# Patient Record
Sex: FEMALE | Race: WHITE
[De-identification: names, ages, dates, MRNs, and addresses within clinical notes are randomized per-mention and may not be internally consistent; named-entity substitution may affect disease eponyms.]

---

## 2018-12-11 ENCOUNTER — HOSPITAL ENCOUNTER (OUTPATIENT)
Dept: HOSPITAL 76 - RT.S | Age: 53
Discharge: HOME | End: 2018-12-11
Attending: NURSE PRACTITIONER
Payer: COMMERCIAL

## 2018-12-11 DIAGNOSIS — R07.89: Primary | ICD-10-CM

## 2018-12-11 PROCEDURE — 93005 ELECTROCARDIOGRAM TRACING: CPT

## 2019-01-04 ENCOUNTER — HOSPITAL ENCOUNTER (EMERGENCY)
Dept: HOSPITAL 76 - ED | Age: 54
Discharge: HOME | End: 2019-01-04
Payer: COMMERCIAL

## 2019-01-04 VITALS — SYSTOLIC BLOOD PRESSURE: 144 MMHG | DIASTOLIC BLOOD PRESSURE: 85 MMHG

## 2019-01-04 DIAGNOSIS — Z82.49: ICD-10-CM

## 2019-01-04 DIAGNOSIS — R07.89: Primary | ICD-10-CM

## 2019-01-04 DIAGNOSIS — R94.31: ICD-10-CM

## 2019-01-04 DIAGNOSIS — R03.0: ICD-10-CM

## 2019-01-04 DIAGNOSIS — Z87.891: ICD-10-CM

## 2019-01-04 LAB
ALBUMIN DIAFP-MCNC: 4.2 G/DL (ref 3.2–5.5)
ALBUMIN/GLOB SERPL: 1.1 {RATIO} (ref 1–2.2)
ALP SERPL-CCNC: 67 IU/L (ref 42–121)
ALT SERPL W P-5'-P-CCNC: 30 IU/L (ref 10–60)
ANION GAP SERPL CALCULATED.4IONS-SCNC: 8 MMOL/L (ref 6–13)
AST SERPL W P-5'-P-CCNC: 29 IU/L (ref 10–42)
BASOPHILS NFR BLD AUTO: 0 10^3/UL (ref 0–0.1)
BASOPHILS NFR BLD AUTO: 0.2 %
BILIRUB BLD-MCNC: 0.7 MG/DL (ref 0.2–1)
BUN SERPL-MCNC: 9 MG/DL (ref 6–20)
CALCIUM UR-MCNC: 8.8 MG/DL (ref 8.5–10.3)
CHLORIDE SERPL-SCNC: 99 MMOL/L (ref 101–111)
CO2 SERPL-SCNC: 29 MMOL/L (ref 21–32)
CREAT SERPLBLD-SCNC: 0.6 MG/DL (ref 0.4–1)
EOSINOPHIL # BLD AUTO: 0.1 10^3/UL (ref 0–0.7)
EOSINOPHIL NFR BLD AUTO: 1.8 %
ERYTHROCYTE [DISTWIDTH] IN BLOOD BY AUTOMATED COUNT: 12.4 % (ref 12–15)
GFRSERPLBLD MDRD-ARVRAT: 105 ML/MIN/{1.73_M2} (ref 89–?)
GLOBULIN SER-MCNC: 3.8 G/DL (ref 2.1–4.2)
GLUCOSE SERPL-MCNC: 95 MG/DL (ref 70–100)
HGB UR QL STRIP: 14.8 G/DL (ref 12–16)
LIPASE SERPL-CCNC: 38 U/L (ref 22–51)
LYMPHOCYTES # SPEC AUTO: 1.8 10^3/UL (ref 1.5–3.5)
LYMPHOCYTES NFR BLD AUTO: 29.8 %
MCH RBC QN AUTO: 33.3 PG (ref 27–31)
MCHC RBC AUTO-ENTMCNC: 34 G/DL (ref 32–36)
MCV RBC AUTO: 97.9 FL (ref 81–99)
MONOCYTES # BLD AUTO: 0.4 10^3/UL (ref 0–1)
MONOCYTES NFR BLD AUTO: 6.8 %
NEUTROPHILS # BLD AUTO: 3.7 10^3/UL (ref 1.5–6.6)
NEUTROPHILS # SNV AUTO: 6 X10^3/UL (ref 4.8–10.8)
NEUTROPHILS NFR BLD AUTO: 61.4 %
PDW BLD AUTO: 7 FL (ref 7.9–10.8)
PLATELET # BLD: 320 10^3/UL (ref 130–450)
PROT SPEC-MCNC: 8 G/DL (ref 6.7–8.2)
RBC MAR: 4.45 10^6/UL (ref 4.2–5.4)
SODIUM SERPLBLD-SCNC: 136 MMOL/L (ref 135–145)

## 2019-01-04 PROCEDURE — 83690 ASSAY OF LIPASE: CPT

## 2019-01-04 PROCEDURE — 84484 ASSAY OF TROPONIN QUANT: CPT

## 2019-01-04 PROCEDURE — 93005 ELECTROCARDIOGRAM TRACING: CPT

## 2019-01-04 PROCEDURE — 99283 EMERGENCY DEPT VISIT LOW MDM: CPT

## 2019-01-04 PROCEDURE — 36415 COLL VENOUS BLD VENIPUNCTURE: CPT

## 2019-01-04 PROCEDURE — 99284 EMERGENCY DEPT VISIT MOD MDM: CPT

## 2019-01-04 PROCEDURE — 71046 X-RAY EXAM CHEST 2 VIEWS: CPT

## 2019-01-04 PROCEDURE — 85379 FIBRIN DEGRADATION QUANT: CPT

## 2019-01-04 PROCEDURE — 85025 COMPLETE CBC W/AUTO DIFF WBC: CPT

## 2019-01-04 PROCEDURE — 71275 CT ANGIOGRAPHY CHEST: CPT

## 2019-01-04 PROCEDURE — 80053 COMPREHEN METABOLIC PANEL: CPT

## 2019-01-04 NOTE — ED PHYSICIAN DOCUMENTATION
History of Present Illness





- Stated complaint


Stated Complaint: CHEST PX





- Chief complaint


Chief Complaint: Cardiac





- Additonal information


Additional information: 





hx from pt


52 y/o f


to ED with CP


this CP started yesterday and has been present for 24 hr


is left sided 


sharp


worse with breathing


nausea


no diaphoresis


no cough


legs are chronically swollen


she has had similar sx many time before


saw PMD in [place] and had an KEG but no labs or stress test etc


she has HTN HLD quit smoking 2 yr ago fhx CAD onset in 6th decade


she wants to be sure she is safe to go fly to [place] to see her mother








Review of Systems


Constitutional: denies: Fever, Chills, Sweats


Cardiac: reports: Chest pain / pressure


Respiratory: denies: Dyspnea (but hurts to breathe), Cough


GI: denies: Abdominal Pain, Nausea, Vomiting, Diarrhea


: denies: Now pregnant EGA (denies states 100% certain)


Neurologic: denies: Generalized weakness


Endocrine: denies: Easy bruising / bleeding


Immunocompromised: denies: Immunocompromised





PD PAST MEDICAL HISTORY





- Present Medications


Home Medications: 


                                Ambulatory Orders











 Medication  Instructions  Recorded  Confirmed


 


Cholecalciferol (Vitamin D3) 2,000 unit PO 01/04/19 01/04/19





[Vitamin D]   


 


Omeprazole  01/04/19 


 


Sucralfate [Carafate] 1 gm PO ACHS #120 tablet 01/04/19 


 


amLODIPine [Norvasc]  01/04/19 01/04/19


 


raNITIdine [Zantac] 150 mg PO BID #60 tablet 01/04/19 














- Allergies


Allergies/Adverse Reactions: 


                                    Allergies











Allergy/AdvReac Type Severity Reaction Status Date / Time


 


methylprednisolone Allergy  Rash Verified 01/04/19 08:36


 


codeine AdvReac  Emesis Verified 01/04/19 08:36














PD ED PE NORMAL





- Vitals


Vital signs reviewed: Yes





- Neck


Neck: Supple, no meningeal sign





- Cardiac


Cardiac: RRR





- Respiratory


Respiratory: No respiratory distress, Clear bilaterally





- Abdomen


Abdomen: Soft, Non tender





- Derm


Derm: Normal color





- Extremities


Extremities: Other (mild jose edema no TTP)





- Neuro


Neuro: Alert and oriented X 3





Results





- Vitals


Vitals: 


                               Vital Signs - 24 hr











  01/04/19 01/04/19





  08:32 11:08


 


Temperature 36.5 C 


 


Heart Rate 75 64


 


Respiratory 16 16





Rate  


 


Blood Pressure 156/95 H 153/108 H


 


O2 Saturation 100 98








                                     Oxygen











O2 Source                      Room air

















- EKG (time done)


  ** 0836


Rate: Rate (enter#) (67)


Rhythm: NSR


Axis: Normal


Intervals: Normal UT


Ischemia: Other (isolated TWI III)





- Labs


Labs: 


                                Laboratory Tests











  01/04/19 01/04/19 01/04/19





  08:50 08:50 08:50


 


WBC  6.0  


 


RBC  4.45  


 


Hgb  14.8  


 


Hct  43.5  


 


MCV  97.9  


 


MCH  33.3 H  


 


MCHC  34.0  


 


RDW  12.4  


 


Plt Count  320  


 


MPV  7.0 L  


 


Neut # (Auto)  3.7  


 


Lymph # (Auto)  1.8  


 


Mono # (Auto)  0.4  


 


Eos # (Auto)  0.1  


 


Baso # (Auto)  0.0  


 


Absolute Nucleated RBC  0.00  


 


Nucleated RBC %  0.0  


 


D-Dimer   


 


Sodium   136 


 


Potassium   3.3 L 


 


Chloride   99 L 


 


Carbon Dioxide   29 


 


Anion Gap   8.0 


 


BUN   9 


 


Creatinine   0.6 


 


Estimated GFR (MDRD)   105 


 


Glucose   95 


 


Calcium   8.8 


 


Total Bilirubin   0.7 


 


AST   29 


 


ALT   30 


 


Alkaline Phosphatase   67 


 


Troponin I    < 0.04


 


Total Protein   8.0 


 


Albumin   4.2 


 


Globulin   3.8 


 


Albumin/Globulin Ratio   1.1 


 


Lipase   38 














  01/04/19





  09:31


 


WBC 


 


RBC 


 


Hgb 


 


Hct 


 


MCV 


 


MCH 


 


MCHC 


 


RDW 


 


Plt Count 


 


MPV 


 


Neut # (Auto) 


 


Lymph # (Auto) 


 


Mono # (Auto) 


 


Eos # (Auto) 


 


Baso # (Auto) 


 


Absolute Nucleated RBC 


 


Nucleated RBC % 


 


D-Dimer  656.3 H


 


Sodium 


 


Potassium 


 


Chloride 


 


Carbon Dioxide 


 


Anion Gap 


 


BUN 


 


Creatinine 


 


Estimated GFR (MDRD) 


 


Glucose 


 


Calcium 


 


Total Bilirubin 


 


AST 


 


ALT 


 


Alkaline Phosphatase 


 


Troponin I 


 


Total Protein 


 


Albumin 


 


Globulin 


 


Albumin/Globulin Ratio 


 


Lipase 














- Rads (name of study)


  ** CXR


Radiology: See rad report (NACPD)





  ** CTPA


Radiology: See rad report (no PE)





PD MEDICAL DECISION MAKING





- ED course


ED course: 





neg EKG and trop after 24 hr of sx rules out ACS - no need for serial trop at 

this point


CXR NACPD


but pain was pleuritic and d dimer + so got CTPA thankfully neg for PE


pt felt better after GI meds and hx GERD so will dc on carafate and zantac in 

addition to her omeprazole





Departure





- Departure


Disposition: 01 Home, Self Care


Clinical Impression: 


Chest pain


Qualifiers:


 Chest pain type: unspecified Qualified Code(s): R07.9 - Chest pain, unspecified





Condition: Good


Instructions:  ED Chest Pain Atypical Unkn Cause, ED GERD


Follow-Up: 


Lyndsay Hall ARNP [Primary Care Provider] - 


Prescriptions: 


raNITIdine [Zantac] 150 mg PO BID #60 tablet


Sucralfate [Carafate] 1 gm PO ACHS #120 tablet


Comments: 


All the tests came back reassuring


The EKG and blood test for a heart attack were negative after 24 hr of symptoms 

which means it was not a heart attack.


The xray was fine and the CT scan did not show a blood clot in your lungs or an 

aneurysm or tear of your aorta.


So it may well be that the symptoms are due to reflux.


In have added two medications to ease your symptoms


I think you are safe to travel.





Please get your blood pressure rechecked when you follow up with your PMD - it 

was a little high today

## 2019-01-04 NOTE — XRAY REPORT
Reason:  cp

Procedure Date:  01/04/2019   

Accession Number:  [id number] / [id number]                    

Procedure:  XR  - Chest 2 View X-Ray CPT Code:  78261

 

FULL RESULT:

 

 

EXAM:

CHEST RADIOGRAPHY

 

EXAM DATE: 1/4/2019 09:08 AM.

 

CLINICAL HISTORY: Chest pain.

 

COMPARISON: None.

 

TECHNIQUE: 2 views.

 

FINDINGS:

Lungs/Pleura: No focal opacities evident. No pleural effusion. No 

pneumothorax. Normal volumes.

 

Mediastinum: Heart and mediastinal contours are unremarkable.

 

Other: No acute osseous abnormality.

IMPRESSION: Normal 2-view chest radiography. No acute cardiopulmonary 

abnormality.

 

[facility]

## 2019-01-04 NOTE — CT REPORT
Reason:  pleuritic cp leg swelling + d dimer

Procedure Date:  01/04/2019   

Accession Number:  [id number] / [id number]                    

Procedure:  CT  - Chest Angio (PE) CPT Code:  

 

FULL RESULT:

 

 

EXAM:

CT ANGIOGRAM CHEST

 

EXAM DATE: 1/4/2019 10:50 AM.

 

CLINICAL HISTORY: Pleuritic chest pain, leg swelling, + D dimer.

 

COMPARISON: None.

 

TECHNIQUE: Routine helical imaging was performed through the chest in the 

pulmonary arterial phase. IV Contrast: OPTI 320  100 mL. Reconstructions: 

Coronal 3-D MIP reconstructions.Sagittal and coronal.

 

In accordance with CT protocol optimization, one or more of the following 

dose reduction techniques were utilized for this exam: automated exposure 

control, adjustment of mA and/or KV based on patient size, or use of 

iterative reconstructive technique.

 

FINDINGS:

Pulmonary Arteries:

Diagnostic quality: Adequate through the segmental arteries. No evidence 

for acute or chronic pulmonary emboli.

 

RV/LV is within normal limits. There is no interventricular septal 

bowing. There is no reflux of contrast material in the IVC.

 

Lungs/Pleura: Mild amount of emphysema, minimal dependent changes 

bilaterally. No suspicious nodules or consolidation. No pleural effusion 

or pneumothorax.

 

Mediastinum: Normal. No cardiac enlargement or adenopathy.

 

Thoracic Aorta: Unremarkable.

 

Upper Abdomen: Unremarkable.

 

Other: None.

IMPRESSION: No pulmonary embolism.

 

[facility]

## 2021-07-12 ENCOUNTER — HOSPITAL ENCOUNTER (OUTPATIENT)
Dept: HOSPITAL 76 - COV | Age: 56
Discharge: HOME | End: 2021-07-12
Attending: FAMILY MEDICINE
Payer: COMMERCIAL

## 2021-07-12 DIAGNOSIS — R06.02: Primary | ICD-10-CM

## 2021-07-12 DIAGNOSIS — R53.83: ICD-10-CM

## 2021-07-12 DIAGNOSIS — Z20.822: ICD-10-CM

## 2021-07-12 DIAGNOSIS — R11.2: ICD-10-CM

## 2021-07-12 DIAGNOSIS — R07.0: ICD-10-CM

## 2021-07-12 DIAGNOSIS — R19.7: ICD-10-CM

## 2023-01-24 ENCOUNTER — HOSPITAL ENCOUNTER (OUTPATIENT)
Dept: HOSPITAL 76 - EMS | Age: 58
Discharge: ANOTHER HOSPITAL | End: 2023-01-24
Payer: COMMERCIAL

## 2023-01-24 ENCOUNTER — HOSPITAL ENCOUNTER (EMERGENCY)
Dept: HOSPITAL 76 - ED | Age: 58
Discharge: HOME | End: 2023-01-24
Payer: COMMERCIAL

## 2023-01-24 VITALS — SYSTOLIC BLOOD PRESSURE: 158 MMHG | DIASTOLIC BLOOD PRESSURE: 92 MMHG

## 2023-01-24 DIAGNOSIS — R10.13: ICD-10-CM

## 2023-01-24 DIAGNOSIS — R03.0: ICD-10-CM

## 2023-01-24 DIAGNOSIS — M54.6: ICD-10-CM

## 2023-01-24 DIAGNOSIS — R07.9: Primary | ICD-10-CM

## 2023-01-24 DIAGNOSIS — M79.18: Primary | ICD-10-CM

## 2023-01-24 DIAGNOSIS — I10: ICD-10-CM

## 2023-01-24 LAB
ALBUMIN DIAFP-MCNC: 4.3 G/DL (ref 3.2–5.5)
ALBUMIN/GLOB SERPL: 1.3 {RATIO} (ref 1–2.2)
ALP SERPL-CCNC: 66 IU/L (ref 42–121)
ALT SERPL W P-5'-P-CCNC: 36 IU/L (ref 10–60)
ANION GAP SERPL CALCULATED.4IONS-SCNC: 8 MMOL/L (ref 6–13)
AST SERPL W P-5'-P-CCNC: 31 IU/L (ref 10–42)
BASOPHILS NFR BLD AUTO: 0 10^3/UL (ref 0–0.1)
BASOPHILS NFR BLD AUTO: 0.3 %
BILIRUB BLD-MCNC: 0.9 MG/DL (ref 0.2–1)
BUN SERPL-MCNC: 12 MG/DL (ref 6–20)
CALCIUM UR-MCNC: 9 MG/DL (ref 8.5–10.3)
CHLORIDE SERPL-SCNC: 99 MMOL/L (ref 101–111)
CO2 SERPL-SCNC: 30 MMOL/L (ref 21–32)
CREAT SERPLBLD-SCNC: 0.6 MG/DL (ref 0.4–1)
EOSINOPHIL # BLD AUTO: 0.1 10^3/UL (ref 0–0.7)
EOSINOPHIL NFR BLD AUTO: 0.9 %
ERYTHROCYTE [DISTWIDTH] IN BLOOD BY AUTOMATED COUNT: 12.3 % (ref 12–15)
GFRSERPLBLD MDRD-ARVRAT: 103 ML/MIN/{1.73_M2} (ref 89–?)
GLOBULIN SER-MCNC: 3.4 G/DL (ref 2.1–4.2)
GLUCOSE SERPL-MCNC: 96 MG/DL (ref 70–100)
HCT VFR BLD AUTO: 43.3 % (ref 37–47)
HGB UR QL STRIP: 14.6 G/DL (ref 12–16)
LIPASE SERPL-CCNC: 38 U/L (ref 22–51)
LYMPHOCYTES # SPEC AUTO: 2.2 10^3/UL (ref 1.5–3.5)
LYMPHOCYTES NFR BLD AUTO: 31.9 %
MCH RBC QN AUTO: 33.3 PG (ref 27–31)
MCHC RBC AUTO-ENTMCNC: 33.7 G/DL (ref 32–36)
MCV RBC AUTO: 98.6 FL (ref 81–99)
MONOCYTES # BLD AUTO: 0.5 10^3/UL (ref 0–1)
MONOCYTES NFR BLD AUTO: 6.9 %
NEUTROPHILS # BLD AUTO: 4.1 10^3/UL (ref 1.5–6.6)
NEUTROPHILS # SNV AUTO: 6.9 X10^3/UL (ref 4.8–10.8)
NEUTROPHILS NFR BLD AUTO: 59.7 %
NRBC # BLD AUTO: 0 /100WBC
NRBC # BLD AUTO: 0 X10^3/UL
PDW BLD AUTO: 8.5 FL (ref 7.9–10.8)
PLATELET # BLD: 302 10^3/UL (ref 130–450)
POTASSIUM SERPL-SCNC: 3.6 MMOL/L (ref 3.5–5)
PROT SPEC-MCNC: 7.7 G/DL (ref 6.7–8.2)
RBC MAR: 4.39 10^6/UL (ref 4.2–5.4)
SODIUM SERPLBLD-SCNC: 137 MMOL/L (ref 135–145)

## 2023-01-24 PROCEDURE — 99284 EMERGENCY DEPT VISIT MOD MDM: CPT

## 2023-01-24 PROCEDURE — 83690 ASSAY OF LIPASE: CPT

## 2023-01-24 PROCEDURE — 80053 COMPREHEN METABOLIC PANEL: CPT

## 2023-01-24 PROCEDURE — 36415 COLL VENOUS BLD VENIPUNCTURE: CPT

## 2023-01-24 PROCEDURE — 85025 COMPLETE CBC W/AUTO DIFF WBC: CPT

## 2023-01-24 PROCEDURE — 84484 ASSAY OF TROPONIN QUANT: CPT

## 2023-01-24 PROCEDURE — 93005 ELECTROCARDIOGRAM TRACING: CPT

## 2023-01-24 NOTE — ED PHYSICIAN DOCUMENTATION
History of Present Illness





- Stated complaint


Stated Complaint: CP





- Chief complaint


Chief Complaint: Cardiac





- Additonal information


Additional information: 





History provided by patient.  She is a fair historian.  57-year-old female 

presents to the emergency department for evaluation of chest pain and right 

shoulder pain.  She reports that she has been having intermittent chest pain for

years but more consistent over the last week.  She describes it as substernal 

subxiphoid and sharp.  No radiation.  No diaphoresis or exertional component.  

She is a former smoker.  Does have a history of hypertension which is untreated 

as she had "severe side effects" to blood pressure medications.





Patient has also reported right shoulder pain off and on for about the last 

month.  She was at a local walk-in clinic today and they are evaluating shoulder

plain.  She also reported the chest pain they did an EKG and had concerned that 

there was ST elevation thus they advised her to come to the ER.





Review of Systems


Constitutional: reports: Reviewed and negative


Cardiac: reports: Chest pain / pressure.  denies: Palpitations, Pedal edema, 

Calf pain


Respiratory: denies: Dyspnea, Cough


GI: reports: Reviewed and negative


: reports: Reviewed and negative


Skin: reports: Reviewed and negative





PD PAST MEDICAL HISTORY





- Past Medical History


Cardiovascular: Hypertension


GI: GERD


Musculoskeletal: Other





- Past Surgical History


Past Surgical History: No





- Present Medications


Home Medications: 


                                Ambulatory Orders











 Medication  Instructions  Recorded  Confirmed


 


Cholecalciferol (Vitamin D3) 2,000 unit PO 01/04/19 01/04/19





[Vitamin D]   


 


Omeprazole  01/04/19 


 


Sucralfate [Carafate] 1 gm PO ACHS #120 tablet 01/04/19 


 


amLODIPine [Norvasc]  01/04/19 01/04/19


 


raNITIdine [Zantac] 150 mg PO BID #60 tablet 01/04/19 


 


Azithromycin [Zithromax] 0 mg PO DAILY #6 tablet 01/24/23 














- Allergies


Allergies/Adverse Reactions: 


                                    Allergies











Allergy/AdvReac Type Severity Reaction Status Date / Time


 


methylprednisolone Allergy  Rash Verified 01/04/19 08:36


 


amlodipine AdvReac  Nausea Verified 01/24/23 12:23


 


codeine AdvReac  Emesis Verified 01/04/19 08:36














- Social History


Does the pt smoke?: No


Smoking Status: Never smoker





PD ED PE NORMAL





- General


General: Alert and oriented X 3, No acute distress





- HEENT


HEENT: PERRL





- Neck


Neck: Supple, no meningeal sign, No adenopathy





- Cardiac


Cardiac: RRR, No murmur





- Respiratory


Respiratory: No respiratory distress, Clear bilaterally





- Abdomen


Abdomen: Normal bowel sounds, Soft





- Derm


Derm: Normal color, Warm and dry





- Extremities


Extremities: No deformity, No tenderness to palpate, Normal ROM s pain





- Neuro


Neuro: Alert and oriented X 3, CNs 2-12 intact


Eye Opening: Spontaneous


Motor: Obeys Commands


Verbal: Oriented


GCS Score: 15





Results





- Vitals


Vitals: 


                               Vital Signs - 24 hr











  01/24/23 01/24/23





  12:15 12:33


 


Temperature 37.2 C 


 


Heart Rate 80 77


 


Respiratory 16 18





Rate  


 


Blood Pressure 190/92 H 179/97 H


 


O2 Saturation 100 97








                                     Oxygen











O2 Source                      Room air

















- EKG (time done)


  ** 1217


Rate: Rate (enter#) (76)


Rhythm: NSR


Axis: Normal


Intervals: Normal OK.  No: Prolonged QT


QRS: Normal


Ischemia: Non specific changes


Compare to prior EKG: Unchanged from prior EKG


Computer interpretation: Agree with computer





- Labs


Labs: 


                                Laboratory Tests











  01/24/23 01/24/23 01/24/23





  12:46 12:46 12:46


 


WBC  6.9  


 


RBC  4.39  


 


Hgb  14.6  


 


Hct  43.3  


 


MCV  98.6  


 


MCH  33.3 H  


 


MCHC  33.7  


 


RDW  12.3  


 


Plt Count  302  


 


MPV  8.5  


 


Neut # (Auto)  4.1  


 


Lymph # (Auto)  2.2  


 


Mono # (Auto)  0.5  


 


Eos # (Auto)  0.1  


 


Baso # (Auto)  0.0  


 


Absolute Nucleated RBC  0.00  


 


Nucleated RBC %  0.0  


 


Sodium   137 


 


Potassium   3.6 


 


Chloride   99 L 


 


Carbon Dioxide   30 


 


Anion Gap   8.0 


 


BUN   12 


 


Creatinine   0.6 


 


Estimated GFR (MDRD)   103 


 


Glucose   96 


 


Calcium   9.0 


 


Total Bilirubin   0.9 


 


AST   31 


 


ALT   36 


 


Alkaline Phosphatase   66 


 


Troponin I High Sens    4.1


 


Total Protein   7.7 


 


Albumin   4.3 


 


Globulin   3.4 


 


Albumin/Globulin Ratio   1.3 


 


Lipase   38 














- Rads (name of study)


  ** cxr


Radiology: Final report received (Possible right lower lung bronchial wall 

thickening and small alveolar opacity in the right lower lung.  Bronchitis may 

have this appearance.  Correlate clinically)





PD Medical Decision Making





- ED course


Complexity details: reviewed results, re-evaluated patient, considered 

differential, d/w patient


ED course: 





57-year-old female presents emergency department for evaluation of intermittent 

chest pain that she has had for a year but worse over the last week.  Described 

as sharp and burning.  She does have a history of gastritis but feels that this 

is different.  She does have some pain that radiates under the left rib cage.  

She also has been experiencing some right shoulder pain.  She denies any cough 

or fevers.  She went to a local walk-in clinic this morning and they advised her

 to come to the ER for further evaluation.





On exam she appears remarkably well.  She has no hypoxia or fevers.  She does 

have a modestly elevated blood pressure with our most recent reading of 157/86.





We did obtain an EKG and per my interpretation shows no ischemic changes and is 

essentially unchanged from the one completed in 2019.  We also obtained a CBC 

and routine electrolytes that also per my interpretation showed no worrisome 

findings.  Her high-sensitivity troponin/biomarker is negative.  Thus I do not 

believe that she presents with acute ACS or an NSTEMI given the duration of 

symptoms a second troponin is not indicated.





We did obtain a chest x-ray and radiology interprets a subtle opacity in the 

right lower lobe.  Though the patient has no fevers or cough I do wonder if she 

has a subtle or early pneumonia that could be contributing to her symptoms as 

well as her right-sided shoulder pain.  I have discussed this finding with her 

and we have elected to start a short course of antibiotics/azithromycin.  I am 

advising the patient to have close follow-up with her primary care provider.  I 

have made the recommendation to have a repeat chest x-ray in 3 to 4 weeks to 

ensure that this area of opacity has resolved.  Also making the recommendation 

for her primary care provider to make referral for stress test and 

echocardiogram.  The usual emergent return precautions were discussed for 

concerns of chest pain/heart failure and shortness of air





Departure





- Departure


Disposition: 01 Home, Self Care


Clinical Impression: 


 Elevated blood pressure reading





Chest pain


Qualifiers:


 Chest pain type: unspecified Qualified Code(s): R07.9 - Chest pain, unspecified





Condition: Stable


Record reviewed to determine appropriate education?: Yes


Prescriptions: 


Azithromycin [Zithromax] 0 mg PO DAILY #6 tablet


Comments: 


Meena you came to the emergency department today because you have been having 

some intermittent chest pain, pain under your left rib as well as right shoulder

 pain.  You are a former smoker and you do have elevated blood pressures and are

 not on any blood pressure medication.





Today in the emergency department your EKG does not look any different than it 

did in 2019.  This is reassuring.  We did obtain a CBC and routine electrolytes 

that showed no worrisome findings.  Your troponin, a marker that can be seen in 

some forms of a heart attack, was not elevated today.  However given your 

history, and age your primary care provider should make a referral for you to 

have an outpatient stress test and echocardiogram.





I encourage you to discuss your blood pressure with your primary care provider 

and determine a course of plan.  If you are choosing to take your blood pressu

res at home please take him at the same time every day after you have been 

sitting for 5 minutes and with your left arm at the level of your heart.





The chest x-ray suggests a very subtle and difficult to see opacity in the right

 lower lung.  Its possible that this is bronchitis or could be an early 

pneumonia.  A subtle pneumonia could explain some of your right shoulder pain.  

I would like you to fill the prescription for the azithromycin and begin taking 

as directed.  Your primary care provider should have your chest x-ray repeated 

in about 3 to 4 weeks to ensure that the subtle opacity has gone away.





Some of the pain under your sternum and under your left ribs could be gastritis 

as you have a history of this.  I recommend you continue taking the omeprazole. 

 You should avoid spicy foods and excessive amounts of caffeine.





Please follow closely with Lois Trujillo.  Return to the emergency department if

 you ever find that you have Severe shortness of air, chest pain that is worse 

when walking, you have any fainting episodes or any other emergent concerns

## 2023-01-24 NOTE — XRAY REPORT
PROCEDURE:  Chest 1 View X-Ray

 

INDICATIONS:  Chest Pain

 

TECHNIQUE:  One view of the chest was acquired.  

 

COMPARISON:  1/4/2019

 

FINDINGS:  

 

Surgical changes and devices:  Overlying monitoring wires.

 

Lungs and pleura:  Low lung volumes. Mild right base bronchial wall thickening and possible small par
enchymal opacity above one of the cardiac monitoring clips. No definite effusion. No pneumothorax.

 

Mediastinum:  Mediastinal contours appear normal.  Heart size is normal.  

 

Bones and chest wall:  No suspicious bony lesions.  Overlying soft tissues appear unremarkable.  

 

 

IMPRESSION:  

 

 

1. Possible right lower lung bronchial wall thickening and small alveolar opacity in the right lower 
lung. Bronchitis may have this appearance. Correlate clinically.

 

Reviewed by: Marta Medley MD on 1/24/2023 12:04 PM [place]

Approved by: Marta Medley MD on 1/24/2023 12:04 PM [facility]

 

 

Station ID:  [id number]

## 2023-06-22 ENCOUNTER — HOSPITAL ENCOUNTER (OUTPATIENT)
Dept: HOSPITAL 76 - DI.WOS | Age: 58
Discharge: HOME | End: 2023-06-22
Payer: COMMERCIAL

## 2023-06-22 DIAGNOSIS — M25.522: Primary | ICD-10-CM
